# Patient Record
Sex: FEMALE | Race: BLACK OR AFRICAN AMERICAN | NOT HISPANIC OR LATINO | Employment: OTHER | URBAN - METROPOLITAN AREA
[De-identification: names, ages, dates, MRNs, and addresses within clinical notes are randomized per-mention and may not be internally consistent; named-entity substitution may affect disease eponyms.]

---

## 2018-04-26 ENCOUNTER — HOSPITAL ENCOUNTER (EMERGENCY)
Facility: HOSPITAL | Age: 23
Discharge: HOME | End: 2018-04-26
Attending: EMERGENCY MEDICINE
Payer: OTHER GOVERNMENT

## 2018-04-26 VITALS
RESPIRATION RATE: 16 BRPM | OXYGEN SATURATION: 99 % | SYSTOLIC BLOOD PRESSURE: 108 MMHG | BODY MASS INDEX: 23.32 KG/M2 | HEART RATE: 70 BPM | DIASTOLIC BLOOD PRESSURE: 74 MMHG | HEIGHT: 65 IN | TEMPERATURE: 98 F | WEIGHT: 140 LBS

## 2018-04-26 DIAGNOSIS — T14.90XA TRAUMA: ICD-10-CM

## 2018-04-26 DIAGNOSIS — S16.1XXA STRAIN OF NECK MUSCLE, INITIAL ENCOUNTER: ICD-10-CM

## 2018-04-26 DIAGNOSIS — S00.03XS CONTUSION OF SCALP, SEQUELA: Primary | ICD-10-CM

## 2018-04-26 DIAGNOSIS — S06.0X0S CONCUSSION WITHOUT LOSS OF CONSCIOUSNESS, SEQUELA: ICD-10-CM

## 2018-04-26 LAB
B-HCG UR QL: NEGATIVE
CTP QC/QA: YES

## 2018-04-26 PROCEDURE — 81025 URINE PREGNANCY TEST: CPT | Performed by: EMERGENCY MEDICINE

## 2018-04-26 PROCEDURE — 99284 EMERGENCY DEPT VISIT MOD MDM: CPT

## 2018-04-26 PROCEDURE — 25000003 PHARM REV CODE 250: Performed by: EMERGENCY MEDICINE

## 2018-04-26 RX ORDER — NAPROXEN SODIUM 220 MG
220 TABLET ORAL
COMMUNITY

## 2018-04-26 RX ORDER — ACETAMINOPHEN 500 MG
1000 TABLET ORAL
Status: COMPLETED | OUTPATIENT
Start: 2018-04-26 | End: 2018-04-26

## 2018-04-26 RX ADMIN — ACETAMINOPHEN 1000 MG: 500 TABLET, FILM COATED ORAL at 09:04

## 2018-04-26 NOTE — DISCHARGE INSTRUCTIONS
Heating pad or moist heat to neck, expect pain for 1-2 weeks, no evidence of cervical spine injury.

## 2018-04-26 NOTE — ED PROVIDER NOTES
"Encounter Date: 4/26/2018    SCRIBE #1 NOTE: I, Marie Lott, am scribing for, and in the presence of,  Deshaun Osborn MD. I have scribed the following portions of the note - Other sections scribed: HPI, ROS, PE and MDM.       History     Chief Complaint   Patient presents with    Head Injury     Fell and hit head on floor yesterday, denies LOC.  "I saw black specks in my right eye for 45 minutes this morning.  This has happened to me before when I had a migraine."  No relief with aleve.       CC: Head Injury    HPI: This 22 y.o. female with no medical history presents to the ED c/o a head injury that occurred yesterday. Pt reports that [place] her opponent and hit her head on "thin ground" (similar to concrete). She states that she immediately began to experience a throbbing sensation to the head as well as neck pain and notes that she was given Aleve for treatment with relief. Pt reports that she returned home and took a nap, but awoke feeling "glossy". She adds that she attempted to sleep again, but awoke at 2:00 am due to the pain. She notes taking Aleve again for treatment without any relief. Pt adds that she awoke again at 3:00 am and began to experience "little black floating dots" to the right eye which lasted for 45 minutes (and presently resolved). She notes that she has experienced this symptom before with a migraine headache, but states that it typically last about 2-3 minutes. Pt presently reports that the headache ("throbbing") and neck pain are persisting, noting that the neck pain is exacerbated with movement. Symptoms are acute, constant and severe (8/10). Pt denies loss of consciousness, nausea, emesis and numbness. No other associated symptoms. 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           The history is provided by the patient. No language " " was used.     Review of patient's allergies indicates:   Allergen Reactions    Amoxicillin Rash     History reviewed. No pertinent past medical history.  Past Surgical History:   Procedure Laterality Date    KNEE SURGERY       History reviewed. No pertinent family history.  Social History   Substance Use Topics    Smoking status: Never Smoker    Smokeless tobacco: Never Used    Alcohol use Yes     Review of Systems   Constitutional: Negative for chills and fever.   HENT: Negative for congestion, ear pain, rhinorrhea and sore throat.    Eyes: Positive for visual disturbance ("little black floating dots" to the right eye; presently resolved). Negative for pain.   Respiratory: Negative for cough and shortness of breath.    Cardiovascular: Negative for chest pain.   Gastrointestinal: Negative for abdominal pain, diarrhea, nausea and vomiting.   Genitourinary: Negative for dysuria.   Musculoskeletal: Positive for neck pain. Negative for back pain.   Skin: Negative for rash.   Neurological: Positive for headaches ("throbbing"). Negative for numbness.       Physical Exam     Initial Vitals [04/26/18 0841]   BP Pulse Resp Temp SpO2   108/74 70 16 98.4 °F (36.9 °C) 99 %      MAP       85.33         Physical Exam    Nursing note and vitals reviewed.  Constitutional: She appears well-developed and well-nourished.  Non-toxic appearance. She does not appear ill.   HENT:   Head: Normocephalic and atraumatic.   Eyes: EOM are normal.   Normal fundus to optic exam.   Neck: Neck supple.   There is mild paracervical tenderness present. No midline tenderness.   Cardiovascular: Normal rate and regular rhythm.   Pulmonary/Chest: Effort normal and breath sounds normal. No respiratory distress.   Abdominal: Soft. Normal appearance and bowel sounds are normal. She exhibits no distension. There is no tenderness.   Musculoskeletal: Normal range of motion.   Neurological: She is alert.   The visual field by confrontation is " normal.    Skin: Skin is warm and dry.   Psychiatric: She has a normal mood and affect.         ED Course   Procedures  Labs Reviewed   POCT URINE PREGNANCY             Medical Decision Making:   Patient is neurologically normal. There is no loss of consciousness. Exam and history are consistent with head contusion, mild concussion, and cervical strain.             Scribe Attestation:   Scribe #1: I performed the above scribed service and the documentation accurately describes the services I performed. I attest to the accuracy of the note.    Attending Attestation:           Physician Attestation for Scribe:  Physician Attestation Statement for Scribe #1: I, Deshaun Osborn MD, reviewed documentation, as scribed by Marie Lott in my presence, and it is both accurate and complete.         I, Dr. Deshaun Osborn, personally performed the services described in this documentation.   All medical record entries made by the scribe were at my direction and in my presence.   I have reviewed the chart and agree that the record is accurate and complete.   Deshaun Osborn MD.  4:50 PM 04/26/2018            Clinical Impression:   The primary encounter diagnosis was Contusion of scalp, sequela. Diagnoses of Trauma, Concussion without loss of consciousness, sequela, and Strain of neck muscle, initial encounter were also pertinent to this visit.                           Deshaun Osborn MD  04/26/18 0613

## 2020-02-14 ENCOUNTER — HOSPITAL ENCOUNTER (EMERGENCY)
Facility: HOSPITAL | Age: 25
Discharge: HOME | End: 2020-02-14
Attending: EMERGENCY MEDICINE
Payer: OTHER GOVERNMENT

## 2020-02-14 VITALS
TEMPERATURE: 98 F | HEART RATE: 63 BPM | DIASTOLIC BLOOD PRESSURE: 72 MMHG | SYSTOLIC BLOOD PRESSURE: 118 MMHG | BODY MASS INDEX: 23.32 KG/M2 | OXYGEN SATURATION: 97 % | HEIGHT: 65 IN | RESPIRATION RATE: 18 BRPM | WEIGHT: 140 LBS

## 2020-02-14 DIAGNOSIS — N76.0 ACUTE VAGINITIS: Primary | ICD-10-CM

## 2020-02-14 LAB
B-HCG UR QL: NEGATIVE
BACTERIA GENITAL QL WET PREP: ABNORMAL
CLUE CELLS VAG QL WET PREP: ABNORMAL
CTP QC/QA: YES
FILAMENT FUNGI VAG WET PREP-#/AREA: ABNORMAL
SPECIMEN SOURCE: ABNORMAL
T VAGINALIS GENITAL QL WET PREP: ABNORMAL
WBC #/AREA VAG WET PREP: ABNORMAL
YEAST GENITAL QL WET PREP: ABNORMAL

## 2020-02-14 PROCEDURE — 87491 CHLMYD TRACH DNA AMP PROBE: CPT

## 2020-02-14 PROCEDURE — 63600175 PHARM REV CODE 636 W HCPCS: Performed by: NURSE PRACTITIONER

## 2020-02-14 PROCEDURE — 59025 FETAL NON-STRESS TEST: CPT

## 2020-02-14 PROCEDURE — 99284 EMERGENCY DEPT VISIT MOD MDM: CPT | Mod: 25

## 2020-02-14 PROCEDURE — 96372 THER/PROPH/DIAG INJ SC/IM: CPT

## 2020-02-14 PROCEDURE — 25000003 PHARM REV CODE 250: Performed by: NURSE PRACTITIONER

## 2020-02-14 PROCEDURE — 81025 URINE PREGNANCY TEST: CPT | Performed by: NURSE PRACTITIONER

## 2020-02-14 PROCEDURE — 87210 SMEAR WET MOUNT SALINE/INK: CPT

## 2020-02-14 RX ORDER — LIDOCAINE HYDROCHLORIDE 10 MG/ML
5 INJECTION INFILTRATION; PERINEURAL
Status: COMPLETED | OUTPATIENT
Start: 2020-02-14 | End: 2020-02-14

## 2020-02-14 RX ORDER — ONDANSETRON 4 MG/1
4 TABLET, ORALLY DISINTEGRATING ORAL
Status: COMPLETED | OUTPATIENT
Start: 2020-02-14 | End: 2020-02-14

## 2020-02-14 RX ORDER — CEFTRIAXONE 250 MG/1
250 INJECTION, POWDER, FOR SOLUTION INTRAMUSCULAR; INTRAVENOUS
Status: DISCONTINUED | OUTPATIENT
Start: 2020-02-14 | End: 2020-02-15 | Stop reason: HOSPADM

## 2020-02-14 RX ORDER — AZITHROMYCIN 250 MG/1
1000 TABLET, FILM COATED ORAL
Status: COMPLETED | OUTPATIENT
Start: 2020-02-14 | End: 2020-02-14

## 2020-02-14 RX ORDER — METRONIDAZOLE 500 MG/1
500 TABLET ORAL EVERY 12 HOURS
Qty: 14 TABLET | Refills: 0 | Status: SHIPPED | OUTPATIENT
Start: 2020-02-14 | End: 2020-02-21

## 2020-02-14 RX ORDER — METRONIDAZOLE 500 MG/1
500 TABLET ORAL
Status: COMPLETED | OUTPATIENT
Start: 2020-02-14 | End: 2020-02-14

## 2020-02-14 RX ADMIN — CEFTRIAXONE SODIUM 250 MG: 250 INJECTION, POWDER, FOR SOLUTION INTRAMUSCULAR; INTRAVENOUS at 09:02

## 2020-02-14 RX ADMIN — LIDOCAINE HYDROCHLORIDE 5 ML: 10 INJECTION, SOLUTION INFILTRATION; PERINEURAL at 09:02

## 2020-02-14 RX ADMIN — METRONIDAZOLE 500 MG: 500 TABLET ORAL at 09:02

## 2020-02-14 RX ADMIN — AZITHROMYCIN MONOHYDRATE 1000 MG: 250 TABLET ORAL at 09:02

## 2020-02-14 RX ADMIN — ONDANSETRON 4 MG: 4 TABLET, ORALLY DISINTEGRATING ORAL at 09:02

## 2020-02-15 NOTE — ED PROVIDER NOTES
Encounter Date: 2/14/2020    SCRIBE #1 NOTE: I, Megan Scott, am scribing for, and in the presence of,  Robert Oakes DNP. I have scribed the following portions of the note - Other sections scribed: HPI, ROS, Procedures, MDM.       History     Chief Complaint   Patient presents with    Vaginal Discharge     Symptoms started last Tuesday. Reports vaginal discharge (milky thin white) accompanied with odor. Also reports vaginal swelling, redness and pain.    Vaginal Swelling     Time seen 20:29    This is a 24 y.o. female with no pertinent PMHx who presents to the Emergency Department with a cc of constant vaginal discharge x10 days. The pt reports that she started her menstrual cycle 11 days ago and used a menstrual pad. She states that an hour later when she changed her pad she noticed that it ruptured and there were tiny beads present. The pt states that when she woke up the next morning she observed open sores on her vagina. Three days later she began to experience vaginal swelling but she was no longer on her menstrual cycle. Pt reports associated vaginal redness, vaginal pain, vaginal itching, and urinary frequency. Pt denies any abdominal pain, dysuria, or hematuria. The pt reports that she used A&D ointment for her vaginal sores w/relief.         The history is provided by the patient. No  was used.     Review of patient's allergies indicates:   Allergen Reactions    Amoxicillin Rash     History reviewed. No pertinent past medical history.  Past Surgical History:   Procedure Laterality Date    KNEE SURGERY       History reviewed. No pertinent family history.  Social History     Tobacco Use    Smoking status: Never Smoker    Smokeless tobacco: Never Used   Substance Use Topics    Alcohol use: Not Currently     Comment: Socially    Drug use: Never     Review of Systems   Constitutional: Negative for fever.   HENT: Negative for sore throat.    Eyes: Negative for visual disturbance.    Respiratory: Negative for shortness of breath.    Cardiovascular: Negative for chest pain.   Gastrointestinal: Negative for abdominal pain.   Genitourinary: Positive for frequency, vaginal discharge and vaginal pain. Negative for dysuria and hematuria.        (+) vaginal itching  (+) vaginal swelling   Musculoskeletal: Negative for back pain.   Skin: Positive for color change (+) vaginal redness. Negative for rash.   Neurological: Negative for headaches.       Physical Exam     Initial Vitals [02/14/20 2023]   BP Pulse Resp Temp SpO2   119/71 69 20 98.5 °F (36.9 °C) 97 %      MAP       --         Physical Exam    Nursing note and vitals reviewed.  Constitutional: She appears well-developed and well-nourished.   HENT:   Head: Normocephalic and atraumatic.   Right Ear: External ear normal.   Left Ear: External ear normal.   Nose: Nose normal.   Eyes: Conjunctivae and EOM are normal. Pupils are equal, round, and reactive to light. Right eye exhibits no discharge. Left eye exhibits no discharge.   Neck: Normal range of motion.   Abdominal: She exhibits no distension. Hernia confirmed negative in the right inguinal area and confirmed negative in the left inguinal area.   Genitourinary: Uterus normal. No labial fusion. There is tenderness on the right labia. There is no rash, lesion or injury on the right labia. There is tenderness on the left labia. There is no rash, lesion or injury on the left labia. Uterus is not deviated, not enlarged, not fixed and not tender. Cervix exhibits no motion tenderness, no discharge and no friability. Right adnexum displays no mass, no tenderness and no fullness. Left adnexum displays no mass, no tenderness and no fullness. No erythema, tenderness or bleeding in the vagina. No foreign body in the vagina. No signs of injury around the vagina. Vaginal discharge (white, thin) found.   Genitourinary Comments: Gyn exam chaperoned by WANG Argueta.   Musculoskeletal: Normal range of motion.    Lymphadenopathy:        Right: No inguinal adenopathy present.        Left: No inguinal adenopathy present.   Neurological: She is alert and oriented to person, place, and time.   Skin: Skin is dry. Capillary refill takes less than 2 seconds.         ED Course   ED US Pelvis Non OB  Date/Time: 2/14/2020 8:56 PM  Performed by: Robert Oakes DNP  Authorized by: Jorge Bill MD         Labs Reviewed   VAGINAL SCREEN - Abnormal; Notable for the following components:       Result Value    Bacteria - Vaginal Screen Occasional (*)     All other components within normal limits   C. TRACHOMATIS/N. GONORRHOEAE BY AMP DNA   POCT URINE PREGNANCY          Imaging Results    None          Medical Decision Making:   Initial Assessment:   This is a 24 y.o. female with no pertinent PMHx who presents to the Emergency Department with a cc of constant vaginal discharge x10 days. I will order a labs, conduct a pelvic exam with collection of swabs, and reassess.  Differential Diagnosis:   Bacterial vaginosis, std, uti, vaginitis  Clinical Tests:   Lab Tests: Ordered and Reviewed  Radiological Study: Ordered and Reviewed  Medical Tests: Ordered and Reviewed            Scribe Attestation:   Scribe #1: I performed the above scribed service and the documentation accurately describes the services I performed. I attest to the accuracy of the note.            ED Course as of Feb 14 2355 Fri Feb 14, 2020 2124 Preg Test, Ur: Negative [VC]   2124 Pending at time of disposition.   C. trachomatis/N. gonorrhoeae by AMP DNA [facility]; Cervix [VC]   2148 Vaginal screen is negative for tricomonas, clue cells (indicating no bv), yeast, or excess bacteria     Vaginal Screen Vagina(!) [VC]      ED Course User Index  [VC] Robert Oakes DNP     Patient was given Rocephin 200 mg IM with lidocaine, azithromycin 1 g p.o., Flagyl 500 mg p.o..  I gave Zofran to stave off nausea caused by antibiotics.  I have also prescribed Flagyl 500 mg  p.o. b.i.d. for symptomatic bacterial vaginosis.  Patient should follow up with OBGYN referrals provided return for any worsening or changes in condition. Symptomatic therapies and return precautions on AVS.   Medication choices were made after reviewing allergies, medications, history, available laboratories.            Clinical Impression:       ICD-10-CM ICD-9-CM   1. Acute vaginitis N76.0 616.10         Disposition:   Disposition: Discharged  Condition: Stable     Scribe attestation: MONI CONTRERAS DNP ACNP-BC FNP-C , personally performed the services described in this documentation. All medical record entries made by the scribe were at my direction and in my presence.  I have reviewed the chart and agree that the record reflects my personal performance and is accurate and complete.            Chart cosigned per facility protocol.     I am administratively signing this document. I or another ED physician was available for consultation for this patient. I did not see the patient, did not have a doctor/patient relationship with the patient, and did not participate in medical decision making in this case.     Jorge Bill MD,   Emergency Medicine  02/14/2020 10:23 PM    [This note was written with voice recognition software.  Please excuse any grammatical errors.]           Jorge Bill MD  02/14/20 1493       Robert Oakes DNP  02/14/20 8563

## 2020-02-15 NOTE — DISCHARGE INSTRUCTIONS
Take antibiotics as ordered. No alcohol with flagyl (metronidazole).  Return to the Emergency department for any worsening or failure to improve, otherwise follow up with your primary care provider.

## 2020-02-15 NOTE — ED TRIAGE NOTES
Pt reports having labial welling, vaginal discharge and mal odorous that started a couple of days ago. Pt denies taking any medicine for this concern.

## 2020-02-19 LAB
C TRACH DNA SPEC QL NAA+PROBE: NOT DETECTED
N GONORRHOEA DNA SPEC QL NAA+PROBE: NOT DETECTED

## 2020-03-07 ENCOUNTER — HOSPITAL ENCOUNTER (EMERGENCY)
Facility: HOSPITAL | Age: 25
Discharge: HOME | End: 2020-03-07
Attending: EMERGENCY MEDICINE
Payer: OTHER GOVERNMENT

## 2020-03-07 VITALS
BODY MASS INDEX: 23.32 KG/M2 | HEIGHT: 65 IN | RESPIRATION RATE: 16 BRPM | SYSTOLIC BLOOD PRESSURE: 107 MMHG | TEMPERATURE: 99 F | HEART RATE: 94 BPM | WEIGHT: 140 LBS | DIASTOLIC BLOOD PRESSURE: 69 MMHG | OXYGEN SATURATION: 98 %

## 2020-03-07 DIAGNOSIS — J02.0 STREP PHARYNGITIS: Primary | ICD-10-CM

## 2020-03-07 LAB
B-HCG UR QL: NEGATIVE
CTP QC/QA: YES
GROUP A STREP, MOLECULAR: POSITIVE

## 2020-03-07 PROCEDURE — 96372 THER/PROPH/DIAG INJ SC/IM: CPT

## 2020-03-07 PROCEDURE — 87651 STREP A DNA AMP PROBE: CPT

## 2020-03-07 PROCEDURE — 81025 URINE PREGNANCY TEST: CPT | Performed by: PHYSICIAN ASSISTANT

## 2020-03-07 PROCEDURE — 63600175 PHARM REV CODE 636 W HCPCS: Performed by: PHYSICIAN ASSISTANT

## 2020-03-07 PROCEDURE — 99284 EMERGENCY DEPT VISIT MOD MDM: CPT | Mod: 25

## 2020-03-07 RX ORDER — DEXAMETHASONE SODIUM PHOSPHATE 4 MG/ML
8 INJECTION, SOLUTION INTRA-ARTICULAR; INTRALESIONAL; INTRAMUSCULAR; INTRAVENOUS; SOFT TISSUE
Status: COMPLETED | OUTPATIENT
Start: 2020-03-07 | End: 2020-03-07

## 2020-03-07 RX ORDER — DEXAMETHASONE 6 MG/1
6 TABLET ORAL
Qty: 2 TABLET | Refills: 0 | Status: SHIPPED | OUTPATIENT
Start: 2020-03-07 | End: 2020-03-09

## 2020-03-07 RX ORDER — KETOROLAC TROMETHAMINE 30 MG/ML
15 INJECTION, SOLUTION INTRAMUSCULAR; INTRAVENOUS
Status: COMPLETED | OUTPATIENT
Start: 2020-03-07 | End: 2020-03-07

## 2020-03-07 RX ORDER — AZITHROMYCIN 500 MG/1
500 TABLET, FILM COATED ORAL DAILY
COMMUNITY

## 2020-03-07 RX ORDER — ACETAMINOPHEN 325 MG/1
325 TABLET ORAL EVERY 6 HOURS PRN
COMMUNITY

## 2020-03-07 RX ADMIN — DEXAMETHASONE SODIUM PHOSPHATE 8 MG: 4 INJECTION, SOLUTION INTRA-ARTICULAR; INTRALESIONAL; INTRAMUSCULAR; INTRAVENOUS; SOFT TISSUE at 09:03

## 2020-03-07 RX ADMIN — KETOROLAC TROMETHAMINE 15 MG: 30 INJECTION, SOLUTION INTRAMUSCULAR; INTRAVENOUS at 09:03

## 2020-03-07 NOTE — ED PROVIDER NOTES
Encounter Date: 3/7/2020    SCRIBE #1 NOTE: I, Michelle Lambert, am scribing for, and in the presence of,  Layo Woodward PA-C. I have scribed the following portions of the note - Other sections scribed: HPI, ROS, PE.       History     Chief Complaint   Patient presents with    Allergic Reaction     Patient states that she thinks she is having a allergic reaction to her strep throat medication. Patient states thaty she took the medication last night and it feels like her throat is more swollen.     CC: Throat swelling    HPI: This is a 24 y.o. female with no pertinent PMHx who presents to the Emergency Department with a cc of throat swelling since last night. Patient states she was recently diagnosed with strep throat and was given Azithromycin and Naproxen which she has been taking for two days. She reports associated difficulty swallowing, sore throat, and a fever (102). She denies rash or itching. No alleviating factors are noted. Patient reports no prior history of similar symptoms. She is not a smoker and does not drink or use drugs. Patient is allergic to Amoxicillin and Penicillin.    The history is provided by the patient. No  was used.     Review of patient's allergies indicates:   Allergen Reactions    Amoxicillin Rash    Penicillins Rash     History reviewed. No pertinent past medical history.  Past Surgical History:   Procedure Laterality Date    KNEE SURGERY       History reviewed. No pertinent family history.  Social History     Tobacco Use    Smoking status: Never Smoker    Smokeless tobacco: Never Used   Substance Use Topics    Alcohol use: Not Currently     Comment: Socially    Drug use: Never     Review of Systems   Constitutional: Positive for fever.   HENT: Positive for sore throat and trouble swallowing. Negative for ear pain.         (+) Throat swelling   Respiratory: Negative for cough and shortness of breath.    Cardiovascular: Negative for chest pain and leg  swelling.   Gastrointestinal: Negative for abdominal pain, diarrhea, nausea and vomiting.   Genitourinary: Negative for dysuria.   Musculoskeletal: Negative for back pain, myalgias and neck pain.   Skin: Negative for rash.        (-) Itching   Neurological: Negative for headaches.       Physical Exam     Initial Vitals [03/07/20 0839]   BP Pulse Resp Temp SpO2   109/63 101 18 98.5 °F (36.9 °C) 99 %      MAP       --         Physical Exam    Nursing note and vitals reviewed.  Constitutional: She appears well-developed and well-nourished.   HENT:   Mouth/Throat: Oropharyngeal exudate present.   Symmetrically enlarged tonsils with erythema and scant exudate. No uvular deviation.     Eyes: EOM are normal. Pupils are equal, round, and reactive to light.   Neck: Normal range of motion. Neck supple.   Mild cervical lymphadenopathy   Cardiovascular: Normal rate, regular rhythm, normal heart sounds and intact distal pulses.   Pulmonary/Chest: Breath sounds normal. No respiratory distress. She has no wheezes. She has no rhonchi. She has no rales.   Musculoskeletal: Normal range of motion. She exhibits no edema.   Lymphadenopathy:     She has cervical adenopathy.   Neurological: She is alert and oriented to person, place, and time.   Skin: Skin is warm and dry. Capillary refill takes less than 2 seconds. No rash noted.   Psychiatric: She has a normal mood and affect. Her behavior is normal.         ED Course   Procedures  Labs Reviewed   GROUP A STREP, MOLECULAR - Abnormal; Notable for the following components:       Result Value    Group A Strep, Molecular Positive (*)     All other components within normal limits   POCT URINE PREGNANCY          Imaging Results    None          Medical Decision Making:   ED Management:  25 yo pt presenting with worsening of sore throat with positive Strep test.  No history of immunocompromise. Pt w no trismus and no airway compromise.  Initially with difficulty tolerating secretions.  After  Toradol and Decadron, patient observed for a period of time in the ED with improved symptoms, and able to tolerate p.o..  Unlikely PTA, RPA, Ludwigs, epiglottitis, acute HIV, or EBV due to HPI and physical exam. Nontoxic appearance.    Plan to DC home with short course of steroids with instructions to continue azithromycin (penicillin allergy). Return precautions given, patient understands and agrees with plan. All questions answered.  Instructed to follow up with PCP.              Scribe Attestation:   Scribe #1: I performed the above scribed service and the documentation accurately describes the services I performed. I attest to the accuracy of the note.                          Clinical Impression:     1. Strep pharyngitis                ED Disposition Condition    Discharge Stable        ED Prescriptions     Medication Sig Dispense Start Date End Date Auth. Provider    dexAMETHasone (DECADRON) 6 MG tablet Take 1 tablet (6 mg total) by mouth daily with breakfast. for 2 days 2 tablet 3/7/2020 3/9/2020 Layo Woodward PA-C        Follow-up Information     Follow up With Specialties Details Why Contact Info    Primary care provider  Schedule an appointment as soon as possible for a visit       [facility] Emergency Medicine Go to  If symptoms worsen [address]  [facility] [address] [phone]                      I, Layo Woodward, personally performed the services described in this documentation. All medical record entries made by the scribe were at my direction and in my presence. I have reviewed the chart and agree that the record reflects my personal performance and is accurate and complete.                 Layo Woodward PA-C  03/07/20 1015

## 2020-03-07 NOTE — ED TRIAGE NOTES
Pt. Reports she took azithromycin, tylenol and naproxen and woke up this morning feeling like her throat is closing. Pt. Is able to speak and control her saliva. Pt. Is not wheezing or c/o SOB. Pt. [place]

## 2020-03-07 NOTE — DISCHARGE INSTRUCTIONS
Continue all previously prescribed medications as directed.  Start Decadron once daily for 2 days starting tomorrow.